# Patient Record
Sex: MALE | Race: WHITE | URBAN - METROPOLITAN AREA
[De-identification: names, ages, dates, MRNs, and addresses within clinical notes are randomized per-mention and may not be internally consistent; named-entity substitution may affect disease eponyms.]

---

## 2020-07-13 ENCOUNTER — OFFICE VISIT (OUTPATIENT)
Dept: URBAN - METROPOLITAN AREA CLINIC 29 | Facility: CLINIC | Age: 70
End: 2020-07-13
Payer: COMMERCIAL

## 2020-07-13 DIAGNOSIS — H35.373 PUCKERING OF MACULA, BILATERAL: Primary | ICD-10-CM

## 2020-07-13 DIAGNOSIS — Z96.1 PRESENCE OF INTRAOCULAR LENS: ICD-10-CM

## 2020-07-13 DIAGNOSIS — H52.223 REGULAR ASTIGMATISM, BILATERAL: ICD-10-CM

## 2020-07-13 PROCEDURE — 92004 COMPRE OPH EXAM NEW PT 1/>: CPT | Performed by: OPTOMETRIST

## 2020-07-13 ASSESSMENT — VISUAL ACUITY
OD: 20/20
OS: 20/20

## 2020-07-13 ASSESSMENT — INTRAOCULAR PRESSURE
OD: 8
OS: 8

## 2020-07-13 NOTE — IMPRESSION/PLAN
Impression: Puckering of macula, bilateral: [id number]. OU. Plan: Discussed condition w/pt, including signs/symptoms of progression. RTC if any changes in vision noticed, including distorted vision. No treatment needed at this time. Monitor condition.